# Patient Record
Sex: FEMALE | Race: WHITE
[De-identification: names, ages, dates, MRNs, and addresses within clinical notes are randomized per-mention and may not be internally consistent; named-entity substitution may affect disease eponyms.]

---

## 2018-05-27 ENCOUNTER — HOSPITAL ENCOUNTER (EMERGENCY)
Dept: HOSPITAL 17 - PHED | Age: 33
Discharge: HOME | End: 2018-05-27
Payer: MEDICAID

## 2018-05-27 VITALS
SYSTOLIC BLOOD PRESSURE: 159 MMHG | HEART RATE: 69 BPM | RESPIRATION RATE: 16 BRPM | DIASTOLIC BLOOD PRESSURE: 77 MMHG | OXYGEN SATURATION: 97 %

## 2018-05-27 VITALS — WEIGHT: 293 LBS | HEIGHT: 62 IN | BODY MASS INDEX: 53.92 KG/M2

## 2018-05-27 VITALS
TEMPERATURE: 97.5 F | RESPIRATION RATE: 16 BRPM | DIASTOLIC BLOOD PRESSURE: 106 MMHG | SYSTOLIC BLOOD PRESSURE: 210 MMHG | OXYGEN SATURATION: 98 % | HEART RATE: 83 BPM

## 2018-05-27 DIAGNOSIS — I10: ICD-10-CM

## 2018-05-27 DIAGNOSIS — F32.9: ICD-10-CM

## 2018-05-27 DIAGNOSIS — M79.672: Primary | ICD-10-CM

## 2018-05-27 PROCEDURE — 73630 X-RAY EXAM OF FOOT: CPT

## 2018-05-27 PROCEDURE — 99283 EMERGENCY DEPT VISIT LOW MDM: CPT

## 2018-05-27 NOTE — PD
HPI


Chief Complaint:  Hypertension


Time Seen by Provider:  12:12


Travel History


International Travel<30 days:  No


Contact w/Intl Traveler<30days:  No


Traveled to known affect area:  No





History of Present Illness


HPI


The patient is a 33-year-old  female who presents to the emergency 

department for left foot pain.  The patient notes a 3 day history of left foot 

pain, over the anterior aspect left foot, worse with plantarflexion and 

palpation.  She denies any trauma to the affected area.  Occasionally pain 

radiates to the anterior aspect of the left tibia/fibula.  She denies any 

posterior left calf pain or posterior left knee pain.  The patient denies any 

swelling or edema to left lower extremity denies any history of DVT.  She does 

note a family history of factor V Leiden deficiency with previous family 

members having DVT, however, states she has never been diagnosed with factor V 

Leiden deficiency.  She is a  with 2 previous  sections and never 

had any complications during pregnancy or DVT during pregnancy.  She does have 

a history of superficial varicosities.  She also notes a history of 

hypertension but is not currently taking any medications.  The patient states 

she is unable to afford a physician secondary to her Medicaid cost of care.  

Symptoms are moderate.





PFSH


Past Medical History


Depression:  Yes


Heart Rhythm Problems:  No


Cardiac Catheterization:  No


Cardiovascular Problems:  Yes (htn not on meds)


High Cholesterol:  No


Congestive Heart Failure:  No


Diabetes:  No


Diminished Hearing:  No


Hypertension:  Yes


Immunizations Current:  No


Myocardial Infarction:  No


PNEUMOCCOCAL Vaccine (Year):  2


Pregnant?:  Not Pregnant


LMP:  18


:  2


Para:  2


Tubal Ligation:  Yes





Past Surgical History


 Section:  Yes (X 2)


Coronary Artery Bypass Graft:  No


Ear Surgery:  Yes (TUBES IN EARS AS CHILD)


Eye Surgery:  Yes


Gynecologic Surgery:  Yes (+HPV )


Tonsillectomy:  Yes


Tympanostomy Tube:  Yes


Other Surgery:  Yes (ADNOIDS REMOVED)





Social History


Alcohol Use:  No


Tobacco Use:  No


Substance Use:  No





Allergies-Medications


(Allergen,Severity, Reaction):  


Coded Allergies:  


     No Known Allergies (Verified  Adverse Reaction, Unknown, 18)


Reported Meds & Prescriptions





Reported Meds & Active Scripts


Active








Review of Systems


Except as stated in HPI:  all other systems reviewed are Neg


General / Constitutional:  No: Fever


Eyes:  No: Blurred Vision


HENT:  No: Lightheadedness


Cardiovascular:  No: Chest Pain or Discomfort


Respiratory:  No: Shortness of Breath


Musculoskeletal:  Positive: Pain, No: Limited ROM, Edema


Skin:  No Rash


Neurologic:  No: Paresthesia, Sensory Disturbance





Physical Exam


Narrative


GENERAL: Awake, alert, pleasant 33-year-old female who appears her stated age 

and is in no acute respiratory distress.


SKIN: Focused skin assessment warm/dry.


HEAD: Atraumatic. Normocephalic. 


EYES: No injection or drainage.


ENT: No nasal bleeding or discharge.  Mucous membranes pink and moist.


NECK: Trachea midline. No JVD. 


CARDIOVASCULAR: Regular rate and rhythm.  No murmur appreciated.


RESPIRATORY: No accessory muscle use. Clear to auscultation. Breath sounds 

equal bilaterally. 


MUSCULOSKELETAL: No obvious deformities.  No edema noted.  No erythema noted.  

Superficial varicosities.  Negative Homans sign.  No tenderness over the left 

calf or left popliteal fossa.  No tenderness over the medial aspect of the left 

thigh.  Mild tenderness over the anterior aspect of the left mid forefoot, she 

is able to dorsiflex which exacerbates her pain, no pain with plantarflexion.  

Positive distal pulses.


NEUROLOGICAL: Awake and alert. No obvious cranial nerve deficits.  Motor 

grossly within normal limits. Normal speech.  Sensation is intact with medial, 

lateral, dorsal aspect of the left foot.


PSYCHIATRIC: Appropriate mood and affect; insight and judgment normal.





Data


Data


Last Documented VS





Vital Signs








  Date Time  Temp Pulse Resp B/P (MAP) Pulse Ox O2 Delivery O2 Flow Rate FiO2


 


18 13:26  69 16 159/77 (104) 97 Room Air  


 


18 12:05 97.5       








Orders





 Orders


Amlodipine (Norvasc) (18 12:30)


Foot, Complete ([id number]) (18 )








[facility]


Medical Decision Making


Medical Screen Exam Complete:  Yes


Emergency Medical Condition:  Yes


Medical Record Reviewed:  Yes


Interpretation(s)





Last Impressions








Foot X-Ray 18 0000 





Impressions: 





 CONCLUSION: 





    Mild degenerative changes involving the midfoot. No acute abnormality.





  





 





  





 





  





 





  





 








Differential Diagnosis


Differential diagnosis includes hypertension, hypertensive urgency, 

hypertensive emergency, DVT, tendinitis, fracture, sprain, strain.


Narrative Course


The patient was administered Norvasc 10 mg orally.  X-ray of the left foot was 

obtained.  The patient has no erythema, swelling, or tenderness of the left 

calf and left popliteal fossa, I doubt DVT.  Negative Homans sign.  Therefore, 

no ultrasound was ordered.  X-ray of the foot reveals mild degenerative 

changes.  The patient will be provided a copy of her x-ray results at 

discharge.  She is advised to apply ice and/or heat of the affected area, 

Tylenol and/or Motrin as needed for pain, and activity as tolerated.  I will 

place the patient on Norvasc 10 mg daily and give her prescription with refills

, but have advised her to keep a blood pressure log and eventually follow-up 

with a primary physician that she will need ongoing continuing care.  Patient 

agrees and understands.  She is stable for discharge.





Diagnosis





 Primary Impression:  


 Left foot pain


 Additional Impression:  


 Hypertension


 Qualified Codes:  I10 - Essential (primary) hypertension


Patient Instructions:  General Instructions





***Additional Instructions:  


Please provide the patient a copy of her x-ray results at discharge.  Norvasc 

as directed.  Tylenol and/or Motrin as needed for pain.  Apply ice and/or heat 

to the affected area.  Follow-up with a primary physician.


***Med/Other Pt SpecificInfo:  Prescription(s) given


Scripts


Amlodipine (Norvasc) 10 Mg Tab


10 MG PO DAILY for Blood Pressure Management, #30 TAB 2 Refills


   Prov: Austen Pollard MD         18


Disposition:   DISCHARGE HOME


Condition:  Stable











Austen Pollard MD May 27, 2018 12:30

## 2018-05-27 NOTE — RADRPT
EXAM DATE:  5/27/2018 1:18 PM EDT

AGE/SEX:        33 years / Female



INDICATIONS:  Left foot pain for 4 days, no known injury



CLINICAL DATA:  This is the patient's initial encounter. Patient reports that signs and symptoms have
 been present for 4 - 6 days and indicates a pain score of 5/10. 

                                                                          

MEDICAL/SURGICAL HISTORY:       Hypertension.  Cirrhosis. None.



COMPARISON:         None.



FINDINGS:  

Bony structures are intact and in normal alignment. Osseous density is normal.  Mild degenerative jacques
nges involving the midfoot. Soft tissues are unremarkable.  No radiopaque foreign bodies seen.   



CONCLUSION: 

   Mild degenerative changes involving the midfoot. No acute abnormality.









Electronically signed by: Wily Martinez MD  5/27/2018 1:33 PM EDT